# Patient Record
Sex: FEMALE | Race: BLACK OR AFRICAN AMERICAN | NOT HISPANIC OR LATINO | Employment: FULL TIME | URBAN - METROPOLITAN AREA
[De-identification: names, ages, dates, MRNs, and addresses within clinical notes are randomized per-mention and may not be internally consistent; named-entity substitution may affect disease eponyms.]

---

## 2020-03-20 ENCOUNTER — TELEPHONE (OUTPATIENT)
Dept: PHARMACY | Facility: CLINIC | Age: 50
End: 2020-03-20

## 2020-03-20 NOTE — TELEPHONE ENCOUNTER
Informed Patient  that [facility] received prescription for Epclusa & Entecavir and benefits investigation is required.  [facility] will be back in touch once insurance determination is received.

## 2020-03-23 ENCOUNTER — TELEPHONE (OUTPATIENT)
Dept: PHARMACY | Facility: CLINIC | Age: 50
End: 2020-03-23

## 2020-03-23 NOTE — TELEPHONE ENCOUNTER
Entecavir initial consultation and shipment attempted. No answer. LVM for call back.   - Patient must be on entecavir for at least 2-weeks prior to shipment of Epculsa

## 2020-03-23 NOTE — TELEPHONE ENCOUNTER
DOCUMENTATION ONLY:  AG Epclusa & Entecavir 0.5mg does not required prior authorization with insurance company.     AG Epclusa  Co-pay: $0     Entecavir 0.5mg  Co-pay: $0      Patient Assistance IS NOT required.     Forward to clinical pharmacist for consult & shipment.

## 2020-03-25 NOTE — TELEPHONE ENCOUNTER
Initial [facility] Medication Consult: Entecavir for HBV reactivation prophylaxis prior to starting Epclusa.      Confirmed 2 patient identifiers - name and . Patient received consultation on entecavir over the phone 3/25. Entecavir 0.5 mg will be shipped on 3/26 to arrive at patient's home on 3/27 via [employer]. Patient will start entecavir on 3/28. $0 copay. Address confirmed. Med list reviewed - no ddi. Allergies reviewed - NKDA. Explained welcome packet, survey, and [facility] services.     Entecavir 0.5 mg- Take one tablet by mouth once daily ON AN EMPTY STOMACH.  Counseling was reviewed:              1. Patient MUST take entecavir at the SAME day every day ON AN EMPTY STOMACH. She will take it at 5 am daily and will eat breakfast at 8 am.              2. Side effects include, but not limited to: headache, cough, upset stomach/vomiting, stomach pains, fatigue, back pain.              Headache: Patient may treat with OTC remedies. If Tylenol is used, dose should  not exceed 2000mg per day.              3. Medication list reviewed. No DDIs or allergies noted. Patient MUST contact myself or provider prior to starting any new OTC, herbal, or prescription drugs to avoid potential DDIs.      Discussed the importance of staying well hydrated while on therapy. Compliance stressed - patient to take missed doses as soon as remembered, but NOT to take 2 doses in one day. Patient will report questions or concerns to myself or practitioner. Patient verbalizes understanding. Patient plans to start entecavir on 3/28. Consultation included: indication; goals of treatment; administration; storage and handling; side effects; how to handle side effects; the importance of compliance; how to handle missed doses; the importance of laboratory monitoring; the importance of keeping all follow up appointments.      Patient's HBV VL is undetectable as of 3/11/20. She will start Entecavir as HBV reactivation prophylaxis before she begins Epclusa for HCV  (plan is to begin in 2 weeks). Patient asked if she can drink alcohol and advised patient to avoid alcohol at least until HCV is cured in order to not further irritate her liver and she agreed. She also asked how long she will have to be on Entecavir and informed patient duration is unknown but at least until duration of HCV treatment and a minimum of 4 months but could be chronic as well. Explained the importance of medication to keep HBV suppressed. Patient understands to report any medication changes to [facility] and provider. All questions answered and addressed to patients satisfaction. I will f/u with her in 1 week from start, [facility] to contact patient in 3 weeks for refills.     Fracisco Avina, Pharm.D.  Pharmacy Resident, PGY-1   [facility]

## 2020-05-07 ENCOUNTER — TELEPHONE (OUTPATIENT)
Dept: PHARMACY | Facility: CLINIC | Age: 50
End: 2020-05-07

## 2020-05-07 NOTE — TELEPHONE ENCOUNTER
Baraclude Refill and Epclusa Initial Consult attempted.   Patient states she still has NOT began treatment with Baraclude at this time. However, she states she plans on starting Monday, 5/11. Patient reminded that she MUST be on Baraclude for at least 2 weeks before beginning HCV treatment and must also continue it during HCV treatment in order to avoid HBV reactivation. Patient verbalized understanding. [facility] will f/u with Epclusa initial and shipment at next Baraclude refill (6/1/20). Will notify provider of delay in start.

## 2020-06-01 NOTE — TELEPHONE ENCOUNTER
Baraclude Refill and AG Epclusa Initial Consult attempted (attempt 1). NA, LVM for call back. Will f/u if no call back. $0 copay.   - Patient was supposed to start Baraclude on 5/11. Patient MUST be on Baraclude for at least 2 weeks before beginning HCV treatment to avoid HBV reactivation. Need to confirm if Baraclude was started on 5/11 like previously discussed before completing Epclusa initial consult.

## 2020-06-03 NOTE — TELEPHONE ENCOUNTER
Baraclude Refill and AG Epclusa Initial Consult attempted (attempt 2). NA, LVM for call back. Will f/u if no call back. $0 copay.   - Patient was supposed to start Baraclude on 5/11. Patient MUST be on Baraclude for at least 2 weeks before beginning HCV treatment to avoid HBV reactivation. Need to confirm if Baraclude was started on 5/11 like previously discussed before completing Epclusa initial consult.

## 2020-06-05 NOTE — TELEPHONE ENCOUNTER
Baraclude Refill and AG Epclusa Initial Consult attempted (attempt 3). [phone] - NA, unable to LVM for call back. [phone] - Spoke with patient's son and he agreed to have patient call back. Will f/u if no call back. $0 copay.   - Patient was supposed to start Baraclude on 5/11. Patient MUST be on Baraclude for at least 2 weeks before beginning HCV treatment to avoid HBV reactivation. Need to confirm if Baraclude was started on 5/11 like previously discussed before completing Epclusa initial consult.

## 2020-06-09 NOTE — TELEPHONE ENCOUNTER
Baraclude Refill and AG Epclusa Initial Consult attempted.  Patient states she started Baraclude on 5/12, completed only 2 weeks of medication, and then stopped. Patient advised that she must be on Baraclude for 2 weeks before starting Epclusa and that she must CONTINUE taking Baraclude all through HCV treatment to avoid HBV reactivation. Patient verbalized understanding. Patient will call [facility] back with Baraclude on hand dose count in order to determine Baraclude refill and shipment date for this month. Will f/u in 48 hours if no call back. [facility] will f/u with Epclusa initial and shipment at next Baraclude refill once the patient has been on and continued taking Baraclude for at least 2 weeks. Will notify provider of delay in start.

## 2020-06-18 NOTE — TELEPHONE ENCOUNTER
Baraclude refill confirmed. We will ship Baraclude refill on  via fedex to arrive on . $0.00 copay- 004. Confirmed 2 patient identifiers - name and . Therapy appropriate.     Patient has 7 doses of Baraclude remaining and takes it daily. Patient restarted Baraclude on . She has been back on the medication for 1 week. [facility] will f/u next week (once patient has been on Baraclude for the full 2 weeks) to complete initial consultation. Pt reports they are not having any side effects so far. No missed doses, no new medications, no new allergies or health conditions reported at this time. No questions or concerns. Advised to call [facility] and provider if any issues arise.  Pt verbalized understanding.

## 2020-06-24 NOTE — TELEPHONE ENCOUNTER
Initial AG Epclusa Consultation attempted:   [facility] contacted patient to determine whether she is ready to complete Epclusa initial consult and shipment. Patient stated that she just received Epclusa yesterday. According to our records, she received a refill on Baraclude yesterday. Patient looked at the bottle and confirmed it was Baraclude. Patient reminded that she is taking Baraclude to prevent HBV reactivation while on Epclusa. Patient verbalized understanding. Patient confirms she has been on Baraclude since 6/12 and has NOT missed any doses as of yet. Patient requested that [facility] call back to complete Epclusa initial consult at 2:00 pm today. Will f/u accordingly. Will also stress at initial that patient must continue to take Baraclude while on Epclusa treatment.    Initiate Treatment: Betamethasone lotion apply once weekly to ears as needed Detail Level: Zone

## 2020-06-24 NOTE — TELEPHONE ENCOUNTER
Initial AG Epclusa Consultation attempted (attempt 1) at 2:00 pm as patient requested. NA, LVM for call back. Will f/u if no call back. $0 copay.

## 2020-07-01 NOTE — TELEPHONE ENCOUNTER
NOTE: Patient will be dispensed authorized generic of Epclusa. All references to Epclusa will be for the authorized generic.    Initial Epclusa consult completed on 20 over the phone, patient callback. Epclusa will be shipped on 20 to arrive at patient's home on 7/3/20 via [employer]. She confirms she will be home to receive the delivery Friday, 7/3/20, because there is a dog on the loose in the neighborhood that she is afraid will get to her package. She is aware of packaging, gets Entecavir with [facility] as well. $0 copay. Patient will start Epclusa on 20, Monday. Address confirmed, CC on file. Confirmed 2 patient identifiers - name and . Therapy Appropriate.     Epclusa 400/100mg- Take one tablet by mouth daily x 12 weeks  Counseling was reviewed:   1. Patient MUST take Epclusa at the SAME time every day.   2. Patient MUST avoid acid reducers without consulting with myself or provider first. Antacids are to be spaced out at least 4 hours apart from Epclusa. Patient rarely suffers from heartburn/indigestion but verbalized understanding.   3. Potential Side effects include: headaches and fatigue.   Headache: Patient may treat with OTC remedies. If Tylenol is used, dose should not exceed 2000mg per day.    4. Medication list reviewed. No DDIs or allergies noted. Patient MUST contact myself or provider prior to starting any new OTC, herbal, or prescription drugs to avoid potential DDIs.    DDI: Medication list. NO DDIs.  Stressed importance to CONTINUE Entecavir while on Epclusa.    Storage: room temperature    HCV disease education, including signs and symptoms, transmission/prevention, complications, support, and treatment response reviewed. Avoidance of any alcohol or illicit drug use was stressed.    Discussed the importance of staying well hydrated while on therapy. Compliance stressed - patient to take missed doses as soon as remembered, but NOT to take 2 doses in one day. Patient will report questions or  concerns to myself or practitioner. Patient verbalizes understanding. Patient plans to start Epclusa on 7/6/20.  Consultation included: indication; goals of treatment; administration; storage and handling; side effects; how to handle side effects; the importance of compliance; how to handle missed doses; the importance of laboratory monitoring; the importance of keeping all follow up appointments.  Patient understands to report any medication changes to [facility] and provider. All questions answered and addressed to patient's satisfaction. I will f/u with patient 1 week from start, [facility] to contact patient in 3 weeks for refills.

## 2020-07-01 NOTE — TELEPHONE ENCOUNTER
Patient called for initial AG Epclusa consult - na unable to lvm.     Samy Vanessa, BESSY.Ph., AAHIVP  Clinical Pharmacist, HIV/HCV  [facility]  Phone: [phone]

## 2020-07-13 ENCOUNTER — TELEPHONE (OUTPATIENT)
Dept: PHARMACY | Facility: CLINIC | Age: 50
End: 2020-07-13

## 2020-07-13 NOTE — TELEPHONE ENCOUNTER
Incoming call - Initial touch base conducted for Epclusa - Name/ confirmed.  Confirmed patient started medication as instructed on .  Patient confirms that they are taking Epclusa every day at 5:25 AM and Baraclude at 12:40 PM.  Patient denies skipping or missing doses - patient advised to ensure NOT missing any doses and to continue both Baralcude and Epclusa. Patient verbalized understanding.  Patient reports experiencing mild but tolerable headaches. She has NOT had to take anything to treat headaches. Patient advised that she can take Tylenol less than 2000 mg per day if needed; patient verbalized understanding. Patient reports no new medications, otc remedies, or allergies. Patient reminded of lab appointments.  Patient counseled on importance of keeping lab appointments which were scheduled.  Advised to call [facility] and provider if any issues arise.  No questions or concerns. Aware [facility] will call for refills when patient has 7 days of medication on hand.    [facility] attempted to complete Baraclude refill but patient states she has 14 doses remaining at this time. Will f/u with Baraclude refill in 1 week. Patient has 21 doses of Epclusa remaining at this time.

## 2020-07-20 ENCOUNTER — TELEPHONE (OUTPATIENT)
Dept: PHARMACY | Facility: CLINIC | Age: 50
End: 2020-07-20

## 2020-07-29 ENCOUNTER — TELEPHONE (OUTPATIENT)
Dept: PHARMACY | Facility: CLINIC | Age: 50
End: 2020-07-29

## 2020-07-31 NOTE — TELEPHONE ENCOUNTER
"Epclusa refill (2 of 3) confirmed and reassessment complete. We will ship Epclusa refill on 8/3 via fedex to arrive on . $0.00 copay- 004. Confirmed 2 patient identifiers - name and . Therapy appropriate.     Patient has 4 doses of Epclusa remaining. She is taking Baraclude ("the little small triangle one") around 5:30am and is taking Epclusa around 12:30pm. Pt reports they are not having any side effects so far. No missed doses, no new medications, no new allergies or health conditions reported at this time. Allergies reviewed and medication reconciliation complete (reviewed and documented in [facility] and [facility]). Patient denies recent heartburn, but reports occasional Tums usage. Counseling provided to space Tums at least 4 hours from Epclusa dose. Pt reports back pain from a recent fall has resolved. Counseled pt to avoid NSAIDs and use Tylenol as preferred pain reliever, if needed (NMT 2000mg/day) Disease education reviewed (including transmission and prevention). No new labs available at this time. Patient counseled on importance of maintaining adherence and keeping lab appointments which were scheduled. All questions answered and addressed to patients satisfaction. Advised to call [facility] and provider if any issues arise.  Pt verbalized understanding.    "

## 2020-08-17 ENCOUNTER — TELEPHONE (OUTPATIENT)
Dept: PHARMACY | Facility: CLINIC | Age: 50
End: 2020-08-17

## 2020-08-17 NOTE — TELEPHONE ENCOUNTER
Baralcude refill complete. We will ship Baralcude refill on  via [employer] to arrive on . $0.00 copay- 004. Confirmed 2 patient identifiers - name and . Therapy appropriate.     Patient has 9 doses of Baraclude remaining and takes it around 5:30 am daily. Patient states she takes Epclusa at 12:30 pm every day. Pt reports they are not having any side effects. No missed doses, no new medications, no new allergies or health conditions reported at this time. Allergies reviewed and medication reconciliation complete (reviewed and documented in [facility] and [facility]). Patient confirms NOT needing anything for heartburn/acid reflux. Patient reminded she can use antacids spaced 4 hours before/after Epclusa dose. Disease education reviewed (including transmission and prevention). Patient counseled on importance of maintaining adherence and keeping lab appointments which were scheduled. All questions answered and addressed to patients satisfaction. Advised to call [facility] and provider if any issues arise. Pt verbalized understanding.

## 2020-08-24 ENCOUNTER — TELEPHONE (OUTPATIENT)
Dept: PHARMACY | Facility: CLINIC | Age: 50
End: 2020-08-24

## 2020-08-24 NOTE — TELEPHONE ENCOUNTER
AG Epclusa refill (3 of 3) and reassessment attempted (attempt 1). No answer. LVM for call back. [facility] to f/u if no return call.

## 2020-08-24 NOTE — TELEPHONE ENCOUNTER
AG Epclusa refill (3 of 3) confirmed and reassessment complete. We will ship AG Epclusa refill on  via [employer]ex to arrive on . $0.00 copay- 004. Confirmed 2 patient identifiers - name and . Therapy appropriate.     Patient has 7 doses of AG Epclusa remaining and takes it around 12:30pm daily.  Pt reports they are not having any side effects so far. No missed doses, no new medications, no new allergies or health conditions reported at this time. Allergies reviewed and medication reconciliation complete (reviewed and documented in [facility] and [facility]).  Disease education reviewed (including transmission and prevention). Patient counseled on importance of maintaining adherence and keeping lab appointments which were scheduled. All questions answered and addressed to patients satisfaction. Advised to call [facility] and provider if any issues arise.  Pt verbalized understanding.    - Patient reminded of EOT lab appointment on 20. Patient expressed understanding of plan for finishing treatment and is confident she will be a treatment success.

## 2020-09-19 ENCOUNTER — TELEPHONE (OUTPATIENT)
Dept: PHARMACY | Facility: CLINIC | Age: 50
End: 2020-09-19

## 2020-09-19 NOTE — TELEPHONE ENCOUNTER
Entecavir refill confirmed. [facility] will ship Entecavir refill on 9/21 via [employer]ex for 9/22 delivery. Copay $0.00. Patient has 6 doses of Entecavir remaining. No missed doses. Patient denies experiencing side effects. No new medications, allergies or medical conditions reported. Discussed that patient is to continue treatment with entecavir unless advised by provider to stop. Explained to patient that HBV is lying dormant in her body and the medication is preventing the virus from becoming active and causing damage to the liver. Patient verbalized understanding. She is aware she will need to continue entecavir. No additional questions or concerns.

## 2022-02-18 PROBLEM — Z12.11 COLON CANCER SCREENING: Status: ACTIVE | Noted: 2022-02-18

## 2022-02-18 PROBLEM — Z00.00 ENCOUNTER FOR SCREENING AND PREVENTATIVE CARE: Status: ACTIVE | Noted: 2022-02-18

## 2022-02-18 PROBLEM — F41.9 ANXIETY: Status: ACTIVE | Noted: 2022-02-18

## 2022-02-18 PROBLEM — M10.9 GOUT: Status: ACTIVE | Noted: 2022-02-18

## 2022-02-18 PROBLEM — I10 UNCONTROLLED HYPERTENSION: Status: ACTIVE | Noted: 2022-02-18

## 2022-02-18 PROBLEM — M65.331 TRIGGER FINGER, RIGHT MIDDLE FINGER: Status: ACTIVE | Noted: 2022-02-18

## 2023-01-11 DIAGNOSIS — E11.9 TYPE 2 DIABETES MELLITUS WITHOUT COMPLICATION: ICD-10-CM

## 2023-07-06 ENCOUNTER — PATIENT OUTREACH (OUTPATIENT)
Dept: ADMINISTRATIVE | Facility: HOSPITAL | Age: 53
End: 2023-07-06

## 2023-07-06 DIAGNOSIS — Z12.11 SCREENING FOR COLORECTAL CANCER: Primary | ICD-10-CM

## 2023-07-06 DIAGNOSIS — Z12.12 SCREENING FOR COLORECTAL CANCER: Primary | ICD-10-CM

## 2023-08-17 ENCOUNTER — PATIENT OUTREACH (OUTPATIENT)
Dept: ADMINISTRATIVE | Facility: HOSPITAL | Age: 53
End: 2023-08-17

## 2023-08-17 DIAGNOSIS — E11.9 TYPE 2 DIABETES MELLITUS WITHOUT COMPLICATION, WITHOUT LONG-TERM CURRENT USE OF INSULIN: Primary | ICD-10-CM

## 2023-08-24 ENCOUNTER — PATIENT OUTREACH (OUTPATIENT)
Dept: ADMINISTRATIVE | Facility: HOSPITAL | Age: 53
End: 2023-08-24

## 2023-10-24 ENCOUNTER — PATIENT OUTREACH (OUTPATIENT)
Dept: ADMINISTRATIVE | Facility: HOSPITAL | Age: 53
End: 2023-10-24

## 2023-10-24 DIAGNOSIS — Z12.11 SCREENING FOR COLORECTAL CANCER: Primary | ICD-10-CM

## 2023-10-24 DIAGNOSIS — Z12.12 SCREENING FOR COLORECTAL CANCER: Primary | ICD-10-CM

## 2024-04-04 ENCOUNTER — PATIENT OUTREACH (OUTPATIENT)
Dept: ADMINISTRATIVE | Facility: HOSPITAL | Age: 54
End: 2024-04-04